# Patient Record
Sex: FEMALE | Race: WHITE
[De-identification: names, ages, dates, MRNs, and addresses within clinical notes are randomized per-mention and may not be internally consistent; named-entity substitution may affect disease eponyms.]

---

## 2020-03-13 ENCOUNTER — HOSPITAL ENCOUNTER (EMERGENCY)
Dept: HOSPITAL 47 - EC | Age: 35
Discharge: HOME | End: 2020-03-13
Payer: COMMERCIAL

## 2020-03-13 VITALS — TEMPERATURE: 97.8 F

## 2020-03-13 VITALS — DIASTOLIC BLOOD PRESSURE: 77 MMHG | RESPIRATION RATE: 16 BRPM | HEART RATE: 92 BPM | SYSTOLIC BLOOD PRESSURE: 92 MMHG

## 2020-03-13 DIAGNOSIS — F13.10: ICD-10-CM

## 2020-03-13 DIAGNOSIS — R74.0: ICD-10-CM

## 2020-03-13 DIAGNOSIS — F10.129: Primary | ICD-10-CM

## 2020-03-13 DIAGNOSIS — F17.200: ICD-10-CM

## 2020-03-13 LAB
ALBUMIN SERPL-MCNC: 4.2 G/DL (ref 3.5–5)
ALP SERPL-CCNC: 77 U/L (ref 38–126)
ALT SERPL-CCNC: 35 U/L (ref 4–34)
ANION GAP SERPL CALC-SCNC: 10 MMOL/L
AST SERPL-CCNC: 60 U/L (ref 14–36)
BASOPHILS # BLD AUTO: 0 K/UL (ref 0–0.2)
BASOPHILS NFR BLD AUTO: 1 %
BUN SERPL-SCNC: 14 MG/DL (ref 7–17)
CALCIUM SPEC-MCNC: 8.7 MG/DL (ref 8.4–10.2)
CHLORIDE SERPL-SCNC: 104 MMOL/L (ref 98–107)
CO2 SERPL-SCNC: 29 MMOL/L (ref 22–30)
EOSINOPHIL # BLD AUTO: 0.2 K/UL (ref 0–0.7)
EOSINOPHIL NFR BLD AUTO: 5 %
ERYTHROCYTE [DISTWIDTH] IN BLOOD BY AUTOMATED COUNT: 3.9 M/UL (ref 3.8–5.4)
ERYTHROCYTE [DISTWIDTH] IN BLOOD: 16.3 % (ref 11.5–15.5)
GLUCOSE SERPL-MCNC: 82 MG/DL (ref 74–99)
HCT VFR BLD AUTO: 38.3 % (ref 34–46)
HGB BLD-MCNC: 12 GM/DL (ref 11.4–16)
LYMPHOCYTES # SPEC AUTO: 1.8 K/UL (ref 1–4.8)
LYMPHOCYTES NFR SPEC AUTO: 55 %
MAGNESIUM SPEC-SCNC: 1.9 MG/DL (ref 1.6–2.3)
MCH RBC QN AUTO: 30.8 PG (ref 25–35)
MCHC RBC AUTO-ENTMCNC: 31.5 G/DL (ref 31–37)
MCV RBC AUTO: 98 FL (ref 80–100)
MONOCYTES # BLD AUTO: 0.1 K/UL (ref 0–1)
MONOCYTES NFR BLD AUTO: 4 %
NEUTROPHILS # BLD AUTO: 1.1 K/UL (ref 1.3–7.7)
NEUTROPHILS NFR BLD AUTO: 33 %
PLATELET # BLD AUTO: 184 K/UL (ref 150–450)
POTASSIUM SERPL-SCNC: 4.4 MMOL/L (ref 3.5–5.1)
PROT SERPL-MCNC: 7.3 G/DL (ref 6.3–8.2)
SODIUM SERPL-SCNC: 143 MMOL/L (ref 137–145)
WBC # BLD AUTO: 3.3 K/UL (ref 3.8–10.6)

## 2020-03-13 PROCEDURE — 82075 ASSAY OF BREATH ETHANOL: CPT

## 2020-03-13 PROCEDURE — 85025 COMPLETE CBC W/AUTO DIFF WBC: CPT

## 2020-03-13 PROCEDURE — 80053 COMPREHEN METABOLIC PANEL: CPT

## 2020-03-13 PROCEDURE — 80306 DRUG TEST PRSMV INSTRMNT: CPT

## 2020-03-13 PROCEDURE — 96375 TX/PRO/DX INJ NEW DRUG ADDON: CPT

## 2020-03-13 PROCEDURE — 36415 COLL VENOUS BLD VENIPUNCTURE: CPT

## 2020-03-13 PROCEDURE — 96374 THER/PROPH/DIAG INJ IV PUSH: CPT

## 2020-03-13 PROCEDURE — 83690 ASSAY OF LIPASE: CPT

## 2020-03-13 PROCEDURE — 96361 HYDRATE IV INFUSION ADD-ON: CPT

## 2020-03-13 PROCEDURE — 83735 ASSAY OF MAGNESIUM: CPT

## 2020-03-13 PROCEDURE — 99284 EMERGENCY DEPT VISIT MOD MDM: CPT

## 2020-03-13 NOTE — ED
Alcohol HPI





- General


Chief Complaint: Alcohol


Stated Complaint: ETOH


Time Seen by Provider: 03/13/20 16:19


Source: patient, RN notes reviewed


Mode of arrival: ambulatory


Limitations: no limitations





- History of Present Illness


Initial Comments: 





35-year-old female presented from [facility] for alcohol intoxication.  

Patient was checking in rehab was found to be severely intoxicated.  Patient 

drinks at least a fifth a day.  Patient does admit that she also takes Librium 

or any benzodiazepine that she can get.  Patient denies any physical complaints 

she is awake alert and orientated.  Patient was found to have some unsteady gait

because she was intoxicated.  No physical complaints.  Patient breath alcohol 

was 335 at [facility].





- Related Data


                                    Allergies











Allergy/AdvReac Type Severity Reaction Status Date / Time


 


No Known Allergies Allergy   Verified 03/13/20 16:38














Review of Systems


ROS Statement: 


Those systems with pertinent positive or pertinent negative responses have been 

documented in the HPI.





ROS Other: All systems not noted in ROS Statement are negative.





Past Medical History


Past Medical History: No Reported History


Past Surgical History: No Surgical Hx Reported


Past Psychological History: Depression


Smoking Status: Current every day smoker


Past Alcohol Use History: Abuse, Daily


Past Drug Use History: None Reported





General Exam


Limitations: no limitations


General appearance: alert, in no apparent distress, appears intoxicated


Head exam: Present: atraumatic, normocephalic, normal inspection


Eye exam: Present: normal appearance, PERRL, EOMI.  Absent: scleral icterus, 

conjunctival injection, periorbital swelling


ENT exam: Present: normal exam, normal oropharynx, mucous membranes moist


Neck exam: Present: normal inspection.  Absent: tenderness, meningismus, 

lymphadenopathy


Respiratory exam: Present: normal lung sounds bilaterally.  Absent: respiratory 

distress, wheezes, rales, rhonchi, stridor


Cardiovascular Exam: Present: regular rate, normal rhythm, normal heart sounds. 

Absent: systolic murmur, diastolic murmur, rubs, gallop, clicks


GI/Abdominal exam: Present: soft, normal bowel sounds.  Absent: distended, 

tenderness, guarding, rebound, rigid


Neurological exam: Present: alert, oriented X3


Skin exam: Present: warm, dry, intact, normal color.  Absent: rash





Course


                                   Vital Signs











  03/13/20 03/13/20 03/13/20





  16:31 18:37 19:23


 


Temperature 97.8 F  


 


Pulse Rate 91 88 93


 


Respiratory 18 18 18





Rate   


 


Blood Pressure 106/80 94/67 99/70


 


O2 Sat by Pulse 95 94 L 98





Oximetry   














Medical Decision Making





- Medical Decision Making





35-year-old presented for alcohol intoxication.  Patient was hydrated, labs show

mild transaminitis, positive drug screen.  Patient is improved she is awake 

alert and orientated.  Patient is not confused.  She is stable medically cleared

for rehab.  Patient will be transferred back to [facility].





- Lab Data


Result diagrams: 


                                 03/13/20 18:31





                                 03/13/20 18:31


                                   Lab Results











  03/13/20 03/13/20 03/13/20 Range/Units





  18:31 18:31 18:31 


 


WBC  3.3 L    (3.8-10.6)  k/uL


 


RBC  3.90    (3.80-5.40)  m/uL


 


Hgb  12.0    (11.4-16.0)  gm/dL


 


Hct  38.3    (34.0-46.0)  %


 


MCV  98.0    (80.0-100.0)  fL


 


MCH  30.8    (25.0-35.0)  pg


 


MCHC  31.5    (31.0-37.0)  g/dL


 


RDW  16.3 H    (11.5-15.5)  %


 


Plt Count  184    (150-450)  k/uL


 


Neutrophils %  33    %


 


Lymphocytes %  55    %


 


Monocytes %  4    %


 


Eosinophils %  5    %


 


Basophils %  1    %


 


Neutrophils #  1.1 L    (1.3-7.7)  k/uL


 


Lymphocytes #  1.8    (1.0-4.8)  k/uL


 


Monocytes #  0.1    (0-1.0)  k/uL


 


Eosinophils #  0.2    (0-0.7)  k/uL


 


Basophils #  0.0    (0-0.2)  k/uL


 


Anisocytosis  Slight    


 


Macrocytosis  Slight    


 


Sodium    143  (137-145)  mmol/L


 


Potassium    4.4  (3.5-5.1)  mmol/L


 


Chloride    104  ()  mmol/L


 


Carbon Dioxide    29  (22-30)  mmol/L


 


Anion Gap    10  mmol/L


 


BUN    14  (7-17)  mg/dL


 


Creatinine    0.56  (0.52-1.04)  mg/dL


 


Est GFR (CKD-EPI)AfAm    >90  (>60 ml/min/1.73 sqM)  


 


Est GFR (CKD-EPI)NonAf    >90  (>60 ml/min/1.73 sqM)  


 


Glucose    82  (74-99)  mg/dL


 


Calcium    8.7  (8.4-10.2)  mg/dL


 


Magnesium    1.9  (1.6-2.3)  mg/dL


 


Total Bilirubin    0.2  (0.2-1.3)  mg/dL


 


AST    60 H  (14-36)  U/L


 


ALT    35 H  (4-34)  U/L


 


Alkaline Phosphatase    77  ()  U/L


 


Total Protein    7.3  (6.3-8.2)  g/dL


 


Albumin    4.2  (3.5-5.0)  g/dL


 


Lipase    242  ()  U/L


 


Urine Opiates Screen   Not Detected   (NotDetected)  


 


Ur Oxycodone Screen   Not Detected   (NotDetected)  


 


Urine Methadone Screen   Not Detected   (NotDetected)  


 


Ur Propoxyphene Screen   Not Detected   (NotDetected)  


 


Ur Barbiturates Screen   Not Detected   (NotDetected)  


 


U Tricyclic Antidepress   Detected H   (NotDetected)  


 


Ur Phencyclidine Scrn   Detected H   (NotDetected)  


 


Ur Amphetamines Screen   Not Detected   (NotDetected)  


 


U Methamphetamines Scrn   Not Detected   (NotDetected)  


 


U Benzodiazepines Scrn   Detected H   (NotDetected)  


 


Urine Cocaine Screen   Not Detected   (NotDetected)  


 


U Marijuana (THC) Screen   Not Detected   (NotDetected)  














Disposition


Clinical Impression: 


 Alcoholic intoxication, Alcohol abuse, Benzodiazepine abuse





Disposition: HOME SELF-CARE


Condition: Stable


Instructions (If sedation given, give patient instructions):  Alcohol 

Intoxication (ED)


Additional Instructions: 


Please return to the Emergency Department if symptoms worsen or any other 

concerns.


Is patient prescribed a controlled substance at d/c from ED?: No


Referrals: 


Nonstaff,Physician [Primary Care Provider] - 1-2 days


Time of Disposition: 19:41

## 2021-06-23 ENCOUNTER — HOSPITAL ENCOUNTER (EMERGENCY)
Dept: HOSPITAL 47 - EC | Age: 36
Discharge: HOME | End: 2021-06-23
Payer: COMMERCIAL

## 2021-06-23 VITALS — SYSTOLIC BLOOD PRESSURE: 111 MMHG | HEART RATE: 84 BPM | RESPIRATION RATE: 16 BRPM | DIASTOLIC BLOOD PRESSURE: 81 MMHG

## 2021-06-23 VITALS — TEMPERATURE: 98.4 F

## 2021-06-23 DIAGNOSIS — F32.9: ICD-10-CM

## 2021-06-23 DIAGNOSIS — F17.200: ICD-10-CM

## 2021-06-23 DIAGNOSIS — F10.10: Primary | ICD-10-CM

## 2021-06-23 DIAGNOSIS — F19.10: ICD-10-CM

## 2021-06-23 PROCEDURE — 96372 THER/PROPH/DIAG INJ SC/IM: CPT

## 2021-06-23 PROCEDURE — 80320 DRUG SCREEN QUANTALCOHOLS: CPT

## 2021-06-23 PROCEDURE — 99283 EMERGENCY DEPT VISIT LOW MDM: CPT

## 2021-06-23 PROCEDURE — 36415 COLL VENOUS BLD VENIPUNCTURE: CPT

## 2021-06-23 PROCEDURE — 80306 DRUG TEST PRSMV INSTRMNT: CPT

## 2021-06-23 NOTE — ED
General Adult HPI





- General


Chief complaint: Alcohol


Stated complaint: med clearance


Time Seen by Provider: 06/23/21 12:45


Source: patient, RN notes reviewed, old records reviewed


Mode of arrival: ambulatory


Limitations: no limitations





- History of Present Illness


Initial comments: 





This is a 36-year-old female presents emergency Department for medical clearance

if she goes to get her for alcohol and drug abuse.  Patient states she did have 

a drink this morning and she took 2 Xanax and one Ativan got off the street.  

Patient denies any other drug use.  Patient states she only had 1 drink today.  

Patient denies any physical complaint.  Patient denies any fever chills or 

cough.  Patient denies chest pain difficult breathing shortness breath per pat

ient denies any nausea vomiting diarrhea.  Patient denies any fall or injury.





- Related Data


                                    Allergies











Allergy/AdvReac Type Severity Reaction Status Date / Time


 


No Known Allergies Allergy   Verified 06/23/21 12:53














Review of Systems


ROS Statement: 


Those systems with pertinent positive or pertinent negative responses have been 

documented in the HPI.





ROS Other: All systems not noted in ROS Statement are negative.





Past Medical History


Past Medical History: No Reported History


History of Any Multi-Drug Resistant Organisms: None Reported


Past Surgical History: No Surgical Hx Reported


Past Psychological History: Depression


Smoking Status: Current every day smoker


Past Alcohol Use History: Abuse, Daily


Past Drug Use History: Prescription Drug Abuse





General Exam





- General Exam Comments


Initial Comments: 





GENERAL:


Patient is well-developed and well-nourished.  Patient is nontoxic and well-

hydrated and is in no acute distress.  Patient is very lethargic but when you 

talk loud or shake her she wakes up and answers questions accurately





ENT:


Neck is soft and supple.  No significant lymphadenopathy is noted.  Oropharynx 

is clear.  Moist mucous membranes.  Neck has full range of motion without 

eliciting any pain.  





EYES:


The sclera were anicteric and conjunctiva were pink and moist.  Extraocular 

movements were intact and pupils were equal round and reactive to light.  

Eyelids were unremarkable.





PULMONARY:


Unlabored respirations.  Good breath sounds bilaterally.  No audible rales 

rhonchi or wheezing was noted.





CARDIOVASCULAR:


There is a regular rate and rhythm without any murmurs gallops or rubs. 





ABDOMEN:


Soft and nontender with normal bowel sounds.  





SKIN:


Skin is clear with no lesions or rashes and otherwise unremarkable.





NEUROLOGIC:


Patient is alert and oriented x3.  Cranial nerves II through XII are grossly 

intact.  Motor and sensory are also intact.  Normal speech, volume and content. 

Symmetrical smile.  





MUSCULOSKELETAL:


Normal extremities with adequate strength and full range of motion.  





LYMPHATICS:


No significant lymphadenopathy is noted





PSYCHIATRIC:


Difficult to evaluate secondary to patient's lethargy


Limitations: no limitations





Course


                                   Vital Signs











  06/23/21 06/23/21





  12:49 13:14


 


Temperature 98.4 F 


 


Pulse Rate 68 


 


Respiratory 13 13





Rate  


 


Blood Pressure 118/91 


 


O2 Sat by Pulse 98 





Oximetry  














Medical Decision Making





- Medical Decision Making





I will back in the room and reevaluated the patient and she was alert and 

oriented 3 she states that she doesn't want to be here any longer she has no 

physical complaints she denies any chest pain difficulty breathing shortness of 

breath she denies abdominal pain she denies any recent trauma.  Patient states 

she thinks she was lethargic secondary to the fact she took some Ativan and 

Xanax off the street today.  Patient states she's given leave here and go to 

sick her heart go to rehabilitation.





- Lab Data


                                   Lab Results











  06/23/21 06/23/21 Range/Units





  13:08 13:24 


 


Urine Opiates Screen   Not Detected  (NotDetected)  


 


Ur Oxycodone Screen   Not Detected  (NotDetected)  


 


Urine Methadone Screen   Not Detected  (NotDetected)  


 


Ur Propoxyphene Screen   Not Detected  (NotDetected)  


 


Ur Barbiturates Screen   Not Detected  (NotDetected)  


 


U Tricyclic Antidepress   Not Detected  (NotDetected)  


 


Ur Phencyclidine Scrn   Not Detected  (NotDetected)  


 


Ur Amphetamines Screen   Not Detected  (NotDetected)  


 


U Methamphetamines Scrn   Not Detected  (NotDetected)  


 


U Benzodiazepines Scrn   Not Detected  (NotDetected)  


 


Urine Cocaine Screen   Not Detected  (NotDetected)  


 


U Marijuana (THC) Screen   Detected H  (NotDetected)  


 


Serum Alcohol  <10   mg/dL














Disposition


Clinical Impression: 


 Alcohol abuse, Drug abuse





Disposition: HOME SELF-CARE


Condition: Good


Instructions (If sedation given, give patient instructions):  Abuse of Alcohol 

(ED), Polysubstance Abuse (ED)


Is patient prescribed a controlled substance at d/c from ED?: No


Referrals: 


Nonstaff,Physician [Primary Care Provider] - 1-2 days


Time of Disposition: 14:22